# Patient Record
Sex: MALE | Race: WHITE | NOT HISPANIC OR LATINO | Employment: FULL TIME | URBAN - METROPOLITAN AREA
[De-identification: names, ages, dates, MRNs, and addresses within clinical notes are randomized per-mention and may not be internally consistent; named-entity substitution may affect disease eponyms.]

---

## 2017-10-05 ENCOUNTER — OFFICE VISIT (OUTPATIENT)
Dept: URGENT CARE | Facility: CLINIC | Age: 22
End: 2017-10-05
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
## 2018-01-10 NOTE — MISCELLANEOUS
Provider Comments  Provider Comments:   No show for a 45 min EDX study appointment        Signatures   Electronically signed by : Wendy Elliott DO; Aug 17 2016  9:37AM EST                       (Author)

## 2018-01-13 NOTE — MISCELLANEOUS
Provider Comments  Provider Comments:   Patient no showed for his appointment on 6/27/2016        Signatures   Electronically signed by : Lázaro Moura, ; Jun 27 2016  6:39PM EST                       (Author)    Electronically signed by : Emma Whitney DO; Jun 28 2016 10:22AM EST